# Patient Record
Sex: FEMALE | Race: WHITE | NOT HISPANIC OR LATINO | Employment: FULL TIME | URBAN - METROPOLITAN AREA
[De-identification: names, ages, dates, MRNs, and addresses within clinical notes are randomized per-mention and may not be internally consistent; named-entity substitution may affect disease eponyms.]

---

## 2017-03-30 ENCOUNTER — OFFICE VISIT (OUTPATIENT)
Dept: INTERNAL MEDICINE | Facility: CLINIC | Age: 27
End: 2017-03-30
Payer: COMMERCIAL

## 2017-03-30 ENCOUNTER — NURSE TRIAGE (OUTPATIENT)
Dept: ADMINISTRATIVE | Facility: CLINIC | Age: 27
End: 2017-03-30

## 2017-03-30 VITALS
WEIGHT: 135.81 LBS | DIASTOLIC BLOOD PRESSURE: 62 MMHG | TEMPERATURE: 100 F | BODY MASS INDEX: 22.63 KG/M2 | HEART RATE: 100 BPM | SYSTOLIC BLOOD PRESSURE: 106 MMHG | HEIGHT: 65 IN

## 2017-03-30 DIAGNOSIS — J03.90 TONSILLITIS: ICD-10-CM

## 2017-03-30 DIAGNOSIS — J02.9 ACUTE PHARYNGITIS, UNSPECIFIED ETIOLOGY: Primary | ICD-10-CM

## 2017-03-30 LAB — DEPRECATED S PYO AG THROAT QL EIA: NEGATIVE

## 2017-03-30 PROCEDURE — 96372 THER/PROPH/DIAG INJ SC/IM: CPT | Mod: S$GLB,,, | Performed by: INTERNAL MEDICINE

## 2017-03-30 PROCEDURE — 99999 PR PBB SHADOW E&M-EST. PATIENT-LVL III: CPT | Mod: PBBFAC,,, | Performed by: PHYSICIAN ASSISTANT

## 2017-03-30 PROCEDURE — 87081 CULTURE SCREEN ONLY: CPT

## 2017-03-30 PROCEDURE — 1160F RVW MEDS BY RX/DR IN RCRD: CPT | Mod: S$GLB,,, | Performed by: PHYSICIAN ASSISTANT

## 2017-03-30 PROCEDURE — 87880 STREP A ASSAY W/OPTIC: CPT

## 2017-03-30 PROCEDURE — 99202 OFFICE O/P NEW SF 15 MIN: CPT | Mod: 25,S$GLB,, | Performed by: PHYSICIAN ASSISTANT

## 2017-03-30 RX ORDER — AZITHROMYCIN 250 MG/1
TABLET, FILM COATED ORAL
Qty: 6 TABLET | Refills: 0 | Status: SHIPPED | OUTPATIENT
Start: 2017-03-30

## 2017-03-30 RX ORDER — METHYLPREDNISOLONE ACETATE 80 MG/ML
80 INJECTION, SUSPENSION INTRA-ARTICULAR; INTRALESIONAL; INTRAMUSCULAR; SOFT TISSUE
Status: COMPLETED | OUTPATIENT
Start: 2017-03-30 | End: 2017-03-30

## 2017-03-30 RX ADMIN — METHYLPREDNISOLONE ACETATE 80 MG: 80 INJECTION, SUSPENSION INTRA-ARTICULAR; INTRALESIONAL; INTRAMUSCULAR; SOFT TISSUE at 09:03

## 2017-03-30 NOTE — TELEPHONE ENCOUNTER
Reason for Disposition   Caller has medication question, adult has minor symptoms, caller declines triage, and triager answers question    Answer Assessment - Initial Assessment Questions  Pt reported scrip was sent to pharmacy this am but their system was down so they didn't receive it.    Protocols used: ST MEDICATION QUESTION CALL-A-    Called and left information for zpak from med card on vm at pt's [facility] pharmacy in [place]

## 2017-03-30 NOTE — MR AVS SNAPSHOT
[facility]  [address] [facility]  [place] [address]  Phone: [phone]  Fax: [phone]                  Alesia Garcia   3/30/2017 8:20 AM   Office Visit    Description:  Female : 1990   Provider:  Garfield Stokes III, PA-C   Department:  [facility]           Reason for Visit     Sore Throat           Diagnoses this Visit        Comments    Acute pharyngitis, unspecified etiology    -  Primary            To Do List           Goals (5 Years of Data)     None       These Medications        Disp Refills Start End    azithromycin (Z-LOLLY) 250 MG tablet 6 tablet 0 3/30/2017     Follow instructions on pack.    Pharmacy: [facility] [address] [place], [place] [address] #: [phone]         [facility]     [facility] Nurse Care Line -  Assistance  Unless otherwise directed by your provider, please contact [facility], our nurse care line that is available for  assistance.     Registered nurses in the [facility] Center provide: appointment scheduling, clinical advisement, health education, and other advisory services.  Call: [phone] (toll free)               Medications           Message regarding Medications     Verify the changes and/or additions to your medication regime listed below are the same as discussed with your clinician today.  If any of these changes or additions are incorrect, please notify your healthcare provider.        START taking these NEW medications        Refills    azithromycin (Z-LOLLY) 250 MG tablet 0    Sig: Follow instructions on pack.    Class: Normal      These medications were administered today        Dose Freq    methylPREDNISolone acetate injection 80 mg 80 mg Clinic/HOD 1 time    Sig: Inject 1 mL (80 mg total) into the muscle one time.    Class: Normal    Route: Intramuscular           Verify that the below list of medications is an  "accurate representation of the medications you are currently taking.  If none reported, the list may be blank. If incorrect, please contact your healthcare provider. Carry this list with you in case of emergency.           Current Medications     MINASTRIN 24 FE 1 mg-20 mcg(24) /75 mg (4) Chew     azithromycin (Z-LOLLY) 250 MG tablet Follow instructions on pack.           Clinical Reference Information           Your Vitals Were     BP Pulse Temp Height    106/62 (BP Location: Right arm, Patient Position: Sitting, BP Method: Manual) 100 100 °F (37.8 °C) (Tympanic) 5' 4.5" (1.638 m)    Weight Last Period BMI    61.6 kg (135 lb 12.9 oz) 03/27/2017 22.95 kg/m2      Blood Pressure          Most Recent Value    BP  106/62      Allergies as of 3/30/2017     No Known Allergies      Immunizations Administered on Date of Encounter - 3/30/2017     None      Orders Placed During Today's Visit      Normal Orders This Visit    Strep A culture, throat     Throat Screen, Rapid       Language Assistance Services     ATTENTION: Language assistance services are available, free of charge. Please call [phone].      ATENCIÓN: Si habla español, tiene a velez disposición servicios gratuitos de asistencia lingüística. Llame al [phone].     YOBANY Ý: N?u b?n nói Ti?ng Vi?t, có các d?ch v? h? tr? ngôn ng? mi?n phí dành cho b?n. G?i s? [phone].         [facility] complies with applicable Federal civil rights laws and does not discriminate on the basis of race, color, national origin, age, disability, or sex.        "

## 2017-03-30 NOTE — PROGRESS NOTES
Patient ID: Alesia Garcia is a 26 y.o. female.     Chief Complaint: Sore Throat (x 1 day)     Sore Throat    This is a new problem. The current episode started yesterday. The problem has been rapidly worsening. The pain is worse on the left side. The maximum temperature recorded prior to her arrival was 100.4 - 100.9 F. The fever has been present for less than 1 day. The pain is at a severity of 7/10. The pain is moderate (constant). Associated symptoms include ear pain (left), swollen glands (left) and trouble swallowing. Pertinent negatives include no abdominal pain, congestion, coughing, diarrhea, drooling, ear discharge, headaches, hoarse voice, plugged ear sensation, neck pain, shortness of breath, stridor or vomiting. She has had no exposure to strep or mono. She has tried gargles and acetaminophen for the symptoms. The treatment provided no relief.      Review of Systems   Constitutional: Negative for appetite change, chills, fatigue and fever.   HENT: Positive for ear pain (left), sore throat and trouble swallowing. Negative for congestion, drooling, ear discharge, hoarse voice, postnasal drip, rhinorrhea, sinus pressure, sneezing and voice change.   Eyes: Negative for pain, itching and visual disturbance.   Respiratory: Negative for cough, choking, chest tightness, shortness of breath, wheezing and stridor.   Cardiovascular: Negative for chest pain, palpitations and leg swelling.   Gastrointestinal: Negative for abdominal pain, constipation, diarrhea, nausea and vomiting.   Genitourinary: Negative for difficulty urinating, flank pain and hematuria.   Musculoskeletal: Positive for myalgias. Negative for arthralgias, back pain, neck pain and neck stiffness.   Skin: Negative for pallor, rash and wound.   Allergic/Immunologic: Negative for environmental allergies.   Neurological: Negative for dizziness, seizures, weakness, numbness and headaches.   Psychiatric/Behavioral: Negative for behavioral problems,  "decreased concentration, hallucinations and sleep disturbance.      PMHx:  History reviewed. No pertinent past medical history.     Review of patient's allergies indicates:  No Known Allergies            Current Outpatient Prescriptions on File Prior to Visit   Medication Sig Dispense Refill    MINASTRIN 24 FE 1 mg-20 mcg(24) /75 mg (4) Chew     8      No current facility-administered medications on file prior to visit.        Social History    Social History            Social History    Marital status: Single       Spouse name: N/A    Number of children: N/A    Years of education: N/A          Occupational History    Not on file.           Social History Main Topics    Smoking status: Never Smoker    Smokeless tobacco: Never Used    Alcohol use No    Drug use: No    Sexual activity: No           Other Topics Concern    Not on file      Social History Narrative               Past Surgical History:   Procedure Laterality Date    BLADDER SURGERY                 Family History   Problem Relation Age of Onset    Hyperlipidemia Mother      Thyroid disease Mother      Diabetes Father      Thyroid disease Father      Cancer Maternal Grandmother         lung    Parkinsonism Maternal Grandfather      Diabetes Paternal Grandmother      Cancer Paternal Grandmother         colon/lung/brain    Diabetes Paternal Grandfather      Cancer Paternal Grandfather        Objective:      /62 (BP Location: Right arm, Patient Position: Sitting, BP Method: Manual)  Pulse 100  Temp 100 °F (37.8 °C) (Tympanic)  Ht 5' 4.5" (1.638 m)  Wt 61.6 kg (135 lb 12.9 oz)  LMP 03/27/2017  BMI 22.95 kg/m2     Physical Exam   Constitutional: She is oriented to person, place, and time. She appears well-developed and well-nourished. No distress.   HENT:   Head: Normocephalic and atraumatic.   Right Ear: Tympanic membrane, external ear and ear canal normal.   Left Ear: Tympanic membrane, external ear and ear canal normal. "   Nose: Nose normal. No mucosal edema or rhinorrhea.   Mouth/Throat: Uvula is midline and mucous membranes are normal. No uvula swelling. Posterior oropharyngeal erythema present. No oropharyngeal exudate. Tonsils are 0 on the right. Tonsils are 2+ on the left. Tonsillar exudate (left tonsil).   Eyes: Conjunctivae and EOM are normal. Pupils are equal, round, and reactive to light. No scleral icterus.   Neck: Normal range of motion. Neck supple. No tracheal deviation present. No thyromegaly present.   Cardiovascular: Normal rate, regular rhythm, normal heart sounds and intact distal pulses. Exam reveals no gallop and no friction rub.   No murmur heard.  Pulmonary/Chest: Effort normal and breath sounds normal. No respiratory distress. She has no wheezes. She has no rales.   Abdominal: Soft. Bowel sounds are normal. She exhibits no distension. There is no tenderness.   Lymphadenopathy:   Head (right side): No submental, no submandibular, no tonsillar, no preauricular, no posterior auricular and no occipital adenopathy present.   Head (left side): Tonsillar adenopathy present. No submental, no submandibular, no preauricular, no posterior auricular and no occipital adenopathy present.   She has no cervical adenopathy.   Neurological: She is alert and oriented to person, place, and time.   Skin: Skin is warm and dry. No rash noted. She is not diaphoretic. No erythema. No pallor.   Psychiatric: She has a normal mood and affect. Her behavior is normal. Thought content normal.   Nursing note and vitals reviewed.     Throat Screen, Rapid   Order: [id number]   Status: Final result   Visible to patient: No (Not Released) Next appt: None Dx: Acute pharyngitis, unspecified etiology      Ref Range & Units 8:27 AM    Rapid Strep A Screen Negative Negative   Comments: See Micro for reflexed Strep culture.   Resulting Agency   [facility]       Specimen Collected: 03/30/17  8:27 AM Last Resulted: 03/30/17  8:40 AM                Assessment:        1. Acute pharyngitis, unspecified etiology        Plan:      1. Acute pharyngitis, unspecified etiology  - Throat Screen, Rapid  - Strep screen was negative today; cultures pending  - Depomedrol 80 mg IM now  - Z-meryl 250 mg tabs; instructions on proper use discussed  - Warm salt water gargle   - Tylenol for pain and/or fever  - Fluids and rest  - Patient is to contact clinic if symptoms don't improve in 3-5 days  - Patient wishes to establish care with a PCP at the [facility]  - RTC prn     Patient understands and agrees with the plan.

## 2017-03-30 NOTE — MEDICAL/APP STUDENT
Subjective:       Patient ID: Alesia Garcia is a 26 y.o. female.    Chief Complaint: Sore Throat (x 1 day)    Sore Throat    This is a new problem. The current episode started yesterday. The problem has been rapidly worsening. The pain is worse on the left side. The maximum temperature recorded prior to her arrival was 100.4 - 100.9 F. The fever has been present for less than 1 day. The pain is at a severity of 7/10. The pain is moderate (constant). Associated symptoms include ear pain (left), swollen glands (left) and trouble swallowing. Pertinent negatives include no abdominal pain, congestion, coughing, diarrhea, drooling, ear discharge, headaches, hoarse voice, plugged ear sensation, neck pain, shortness of breath, stridor or vomiting. She has had no exposure to strep or mono. She has tried gargles and acetaminophen for the symptoms. The treatment provided no relief.     Review of Systems   Constitutional: Negative for appetite change, chills, fatigue and fever.   HENT: Positive for ear pain (left), sore throat and trouble swallowing. Negative for congestion, drooling, ear discharge, hoarse voice, postnasal drip, rhinorrhea, sinus pressure, sneezing and voice change.    Eyes: Negative for pain, itching and visual disturbance.   Respiratory: Negative for cough, choking, chest tightness, shortness of breath, wheezing and stridor.    Cardiovascular: Negative for chest pain, palpitations and leg swelling.   Gastrointestinal: Negative for abdominal pain, constipation, diarrhea, nausea and vomiting.   Genitourinary: Negative for difficulty urinating, flank pain and hematuria.   Musculoskeletal: Positive for myalgias. Negative for arthralgias, back pain, neck pain and neck stiffness.   Skin: Negative for pallor, rash and wound.   Allergic/Immunologic: Negative for environmental allergies.   Neurological: Negative for dizziness, seizures, weakness, numbness and headaches.   Psychiatric/Behavioral: Negative for  "behavioral problems, decreased concentration, hallucinations and sleep disturbance.     PMHx:  History reviewed. No pertinent past medical history.    Review of patient's allergies indicates:  No Known Allergies    Current Outpatient Prescriptions on File Prior to Visit   Medication Sig Dispense Refill    MINASTRIN 24 FE 1 mg-20 mcg(24) /75 mg (4) Chew   8     No current facility-administered medications on file prior to visit.      Social History     Social History    Marital status: Single     Spouse name: N/A    Number of children: N/A    Years of education: N/A     Occupational History    Not on file.     Social History Main Topics    Smoking status: Never Smoker    Smokeless tobacco: Never Used    Alcohol use No    Drug use: No    Sexual activity: No     Other Topics Concern    Not on file     Social History Narrative     Past Surgical History:   Procedure Laterality Date    BLADDER SURGERY       Family History   Problem Relation Age of Onset    Hyperlipidemia Mother     Thyroid disease Mother     Diabetes Father     Thyroid disease Father     Cancer Maternal Grandmother      lung    Parkinsonism Maternal Grandfather     Diabetes Paternal Grandmother     Cancer Paternal Grandmother      colon/lung/brain    Diabetes Paternal Grandfather     Cancer Paternal Grandfather      Objective:     /62 (BP Location: Right arm, Patient Position: Sitting, BP Method: Manual)  Pulse 100  Temp 100 °F (37.8 °C) (Tympanic)   Ht 5' 4.5" (1.638 m)  Wt 61.6 kg (135 lb 12.9 oz)  LMP 03/27/2017  BMI 22.95 kg/m2    Physical Exam   Constitutional: She is oriented to person, place, and time. She appears well-developed and well-nourished. No distress.   HENT:   Head: Normocephalic and atraumatic.   Right Ear: Tympanic membrane, external ear and ear canal normal.   Left Ear: Tympanic membrane, external ear and ear canal normal.   Nose: Nose normal. No mucosal edema or rhinorrhea.   Mouth/Throat: Uvula is " midline and mucous membranes are normal. No uvula swelling. Posterior oropharyngeal erythema present. No oropharyngeal exudate. Tonsils are 0 on the right. Tonsils are 2+ on the left. Tonsillar exudate (left tonsil).   Eyes: Conjunctivae and EOM are normal. Pupils are equal, round, and reactive to light. No scleral icterus.   Neck: Normal range of motion. Neck supple. No tracheal deviation present. No thyromegaly present.   Cardiovascular: Normal rate, regular rhythm, normal heart sounds and intact distal pulses.  Exam reveals no gallop and no friction rub.    No murmur heard.  Pulmonary/Chest: Effort normal and breath sounds normal. No respiratory distress. She has no wheezes. She has no rales.   Abdominal: Soft. Bowel sounds are normal. She exhibits no distension. There is no tenderness.   Lymphadenopathy:        Head (right side): No submental, no submandibular, no tonsillar, no preauricular, no posterior auricular and no occipital adenopathy present.        Head (left side): Tonsillar adenopathy present. No submental, no submandibular, no preauricular, no posterior auricular and no occipital adenopathy present.     She has no cervical adenopathy.   Neurological: She is alert and oriented to person, place, and time.   Skin: Skin is warm and dry. No rash noted. She is not diaphoretic. No erythema. No pallor.   Psychiatric: She has a normal mood and affect. Her behavior is normal. Thought content normal.   Nursing note and vitals reviewed.    Throat Screen, Rapid   Order: [id number]   Status:  Final result   Visible to patient:  No (Not Released) Next appt:  None Dx:  Acute pharyngitis, unspecified etiology      Ref Range & Units 8:27 AM     Rapid Strep A Screen Negative Negative   Comments: See Micro for reflexed Strep culture.   Resulting Agency  [facility]      Specimen Collected: 03/30/17  8:27 AM Last Resulted: 03/30/17  8:40 AM              Assessment:       1. Acute pharyngitis, unspecified etiology        Plan:      1. Acute pharyngitis, unspecified etiology  - Throat Screen, Rapid  - Strep screen was negative today; cultures pending  - Depomedrol 80 mg IM now  - Z-meryl 250 mg tabs; instructions on proper use discussed  - Warm salt water gargle   - Tylenol for pain and/or fever  - Fluids and rest  - Patient is to contact clinic if symptoms don't improve in 3-5 days  - Patient wishes to establish care with a PCP at the [facility]  - RTC prn    Patient understands and agrees with the plan.    Patient seen and examined with Garfield Stokes PA-C and agrees with the above plan.    JOY ElliottS  3/30/2017

## 2017-03-31 ENCOUNTER — TELEPHONE (OUTPATIENT)
Dept: INTERNAL MEDICINE | Facility: CLINIC | Age: 27
End: 2017-03-31

## 2017-03-31 NOTE — TELEPHONE ENCOUNTER
----- Message from Regina Tilley sent at 3/30/2017  1:35 PM CDT -----  Contact: self [phone]  States that the system was down at her pharm and she needs her rx's sent to the pharm again. Pt uses     [facility] [address] [place] [address] THALIA GOLDSTEIN  [address] [facility] AT [facility]  [address] [facility] [address] [facility]  [place] [address]  Phone: [phone] Fax: [phone]    Please call back at [phone]//thank you acc

## 2017-04-01 LAB — BACTERIA THROAT CULT: NORMAL
